# Patient Record
Sex: FEMALE | Race: WHITE | Employment: UNEMPLOYED | URBAN - METROPOLITAN AREA
[De-identification: names, ages, dates, MRNs, and addresses within clinical notes are randomized per-mention and may not be internally consistent; named-entity substitution may affect disease eponyms.]

---

## 2022-10-31 NOTE — PROGRESS NOTES
Hematology/Oncology  Consult  Note    Name: Aan Jenkins  Date: 2022  : 2000  Primary Care Provider: GREGOR Whitt    Ms. Raven Miguel is a 25y.o. year old female with mildly low  platelet count. Patient has menorrhagia and occasional epistaxis. Both of these are much improved when she is on OCP. However, she does not tolerate the OCP which she was on. Family as a whole did not have bleeding or platelet issues as far as the patient knowes. She will ask specifically about this issue. Patient faints every day and will  follow up with Neurologist.    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 These bruises heal easily. Patient drinks alcohol rarely     Patient vapes nicotine often    Current Therapy: diagnostic evaluation    Subjective: The past medical, surgical and social history has been reviewed and remains unchanged. Past Medical History:   Diagnosis Date    Anxiety     Depression      History reviewed. No pertinent surgical history.   Social History     Socioeconomic History    Marital status: SINGLE     Spouse name: Not on file    Number of children: Not on file    Years of education: Not on file    Highest education level: Not on file   Occupational History    Not on file   Tobacco Use    Smoking status: Never    Smokeless tobacco: Never   Vaping Use    Vaping Use: Every day    Substances: Nicotine    Devices: Disposable   Substance and Sexual Activity    Alcohol use: Never    Drug use: Never    Sexual activity: Not on file   Other Topics Concern    Not on file   Social History Narrative    Not on file     Social Determinants of Health     Financial Resource Strain: Not on file   Food Insecurity: Not on file   Transportation Needs: Not on file   Physical Activity: Not on file   Stress: Not on file   Social Connections: Not on file   Intimate Partner Violence: Not on file   Housing Stability: Not on file     Family History   Problem Relation Age of Onset    Diabetes Maternal Grandmother     Diabetes Maternal Grandfather     Diabetes Paternal Grandmother     Diabetes Paternal Grandfather          Review of Systems:    General :The patient has no complaints and there is no physical distress evident. Psychological : patient denies having any psychological symptoms such as hallucinations depression or anxiety. Ophthalmic:the patient denies having any visual impairment or eye discomfort. ENT: there are no abnormalities reported. Allergy and Immunology:the patient denies having any seasonal allergies or allergies to medications other than those already outlined above. Hematological and Lymphatic: the patient denies having any bruising, bleeding or lymphadenopathy. Endocrine: the patient denies having any heat or cold intolerance. There is no history of diabetes or thyroid disorders. Breast: the patient denies having any history of breast mass or lumps. Respiratory:the patient denies having any cough, shortness of breath, or dyspnea on exertion. Cardiovascular: there are no complaints of chest pain, palpitations, chest pounding, or dyspnea on exertion. Gastrointestinal: the patient denies having nausea, emesis, diarrhea, constipation, or blood in the stool. Genito-Urinary: the patient denies having urinary urgency, frequency, or dysuria. Musculoskeletal: with the exception of mild arthralgias the patient has no other musculoskeletal complaints. Neurological:  denies having any numbness, tingling, or neurologic deficits. Dermatological: patient denies having any unexplained rash, skin ulcerations, or hives.     Objective:     Visit Vitals  /68 (BP 1 Location: Right upper arm, BP Patient Position: Sitting)   Pulse 77   Resp 12   Ht 5' 6\" (1.676 m)   Wt 49.9 kg (110 lb)   SpO2 100%   BMI 17.75 kg/m²     Pain Score: 0    Physical Exam: ECO    General: Well appearing, in NAD    Psychologic: mood and affect are appropriate, no anxiety or depression noted    Skin: examination of the skin reveals no bruising, rash or petechiae    HEENT: Normocephalic, atraumatic. Conjunctiva and sclera are clear. Pupils are equal, round and reactive to light. EOMs are intact. ENT without oral mucosal lesions, stomatitis or thrush    Neck: supple without lymphadenopathy, JVD or thyromegaly    Lymphatics: no palpable cervical, supraclavicular, infraclavicular, axillary or inguinal lymphadenopathy    Lungs: clear breath sounds bilaterally, no rhonchi or wheezes noted    Heart: Regular rate and rhythm, S1-S2 noted. Abdomen: soft, non-tender, non-distended, no HSM,     Extremities: without clubbing, cyanosis or edema    Neurologic: no focal deficits, steady gait, Alert and oriented x 3. Laboratory Data:     No results found for this or any previous visit. There is no problem list on file for this patient. Assessment:     1. Low platelet count ([address]    2. Vitamin B 12 deficiency    3. Vitamin D deficiency    4. Iron deficiency    5. Other specified hypothyroidism        Plan:     Blood tests today  We shall have a follow-up phone visit in 1 week's time. Should there be abnormalities we may do more extensive evaluation of patient's hematologic system. I recommended that patient also follow-up with gynecologist to obtain an alternative OCP which she might be able to tolerate better from her emotional psychological point of view  Patient will follow up with neurology to address her daily fainting  Patient had opportunity to ask questions which were answered.   Patient is in agreement with this plan       Orders Placed This Encounter    CBC WITH AUTOMATED DIFF     Standing Status:   Future     Standing Expiration Date:   2023    VITAMIN D, 25 HYDROXY     Standing Status:   Future     Standing Expiration Date:   2023    VITAMIN B12 & FOLATE     Standing Status:   Future     Standing Expiration Date:   11/1/2023    SED RATE (ESR)     Standing Status:   Future     Standing Expiration Date:   11/1/2023    PLATELET COUNT     Standing Status:   Future     Standing Expiration Date:   11/1/2023    PERIPHERAL SMEAR     Standing Status:   Future     Standing Expiration Date:   96/17/0358    METABOLIC PANEL, COMPREHENSIVE     Standing Status:   Future     Standing Expiration Date:   11/1/2023       Buster Olmos MD  11/1/2022

## 2022-11-01 ENCOUNTER — HOSPITAL ENCOUNTER (OUTPATIENT)
Dept: INFUSION THERAPY | Age: 22
Discharge: HOME | End: 2022-11-01
Payer: COMMERCIAL

## 2022-11-01 ENCOUNTER — OFFICE VISIT (OUTPATIENT)
Age: 22
End: 2022-11-01
Payer: COMMERCIAL

## 2022-11-01 VITALS — DIASTOLIC BLOOD PRESSURE: 68 MMHG | SYSTOLIC BLOOD PRESSURE: 102 MMHG

## 2022-11-01 VITALS
RESPIRATION RATE: 12 BRPM | DIASTOLIC BLOOD PRESSURE: 68 MMHG | WEIGHT: 110 LBS | BODY MASS INDEX: 17.68 KG/M2 | OXYGEN SATURATION: 100 % | HEIGHT: 66 IN | SYSTOLIC BLOOD PRESSURE: 102 MMHG | HEART RATE: 77 BPM

## 2022-11-01 DIAGNOSIS — D69.6 LOW PLATELET COUNT (HCC): Primary | ICD-10-CM

## 2022-11-01 DIAGNOSIS — E55.9 VITAMIN D DEFICIENCY: ICD-10-CM

## 2022-11-01 DIAGNOSIS — E53.8 VITAMIN B 12 DEFICIENCY: ICD-10-CM

## 2022-11-01 DIAGNOSIS — D69.6 LOW PLATELET COUNT (HCC): ICD-10-CM

## 2022-11-01 DIAGNOSIS — E61.1 IRON DEFICIENCY: ICD-10-CM

## 2022-11-01 DIAGNOSIS — E03.8 OTHER SPECIFIED HYPOTHYROIDISM: ICD-10-CM

## 2022-11-01 LAB
25(OH)D3 SERPL-MCNC: 10.6 NG/ML (ref 30–100)
ALBUMIN SERPL-MCNC: 4.4 G/DL (ref 3.4–5)
ALBUMIN/GLOB SERPL: 1.4 {RATIO} (ref 0.8–1.7)
ALP SERPL-CCNC: 67 U/L (ref 45–117)
ALT SERPL-CCNC: 19 U/L (ref 13–56)
ANION GAP SERPL CALC-SCNC: 4 MMOL/L (ref 3–18)
AST SERPL-CCNC: 11 U/L (ref 10–38)
BASO+EOS+MONOS # BLD AUTO: 0.4 K/UL (ref 0–2.3)
BASO+EOS+MONOS NFR BLD AUTO: 8 % (ref 0.1–17)
BILIRUB SERPL-MCNC: 0.7 MG/DL (ref 0.2–1)
BUN SERPL-MCNC: 13 MG/DL (ref 7–18)
BUN/CREAT SERPL: 20 (ref 12–20)
CALCIUM SERPL-MCNC: 9.2 MG/DL (ref 8.5–10.1)
CHLORIDE SERPL-SCNC: 106 MMOL/L (ref 100–111)
CO2 SERPL-SCNC: 28 MMOL/L (ref 21–32)
CREAT SERPL-MCNC: 0.64 MG/DL (ref 0.6–1.3)
DIFFERENTIAL METHOD BLD: ABNORMAL
ERYTHROCYTE [DISTWIDTH] IN BLOOD BY AUTOMATED COUNT: 12.6 % (ref 11.5–14.5)
ERYTHROCYTE [SEDIMENTATION RATE] IN BLOOD: 2 MM/HR (ref 0–20)
FOLATE SERPL-MCNC: 18 NG/ML (ref 3.1–17.5)
GLOBULIN SER CALC-MCNC: 3.1 G/DL (ref 2–4)
GLUCOSE SERPL-MCNC: 81 MG/DL (ref 74–99)
HCT VFR BLD AUTO: 40.2 % (ref 36–48)
HGB BLD-MCNC: 13.4 G/DL (ref 12–16)
LYMPHOCYTES # BLD: 2.1 K/UL (ref 1.1–5.9)
LYMPHOCYTES NFR BLD: 46 % (ref 14–44)
MCH RBC QN AUTO: 30.4 PG (ref 25–35)
MCHC RBC AUTO-ENTMCNC: 33.3 G/DL (ref 31–37)
MCV RBC AUTO: 91.2 FL (ref 78–102)
NEUTS SEG # BLD: 2.2 K/UL (ref 1.8–9.5)
NEUTS SEG NFR BLD: 47 % (ref 40–70)
PLATELET # BLD AUTO: 160 K/UL (ref 140–440)
PLATELET # BLD AUTO: 163 K/UL (ref 135–420)
POTASSIUM SERPL-SCNC: 4.4 MMOL/L (ref 3.5–5.5)
PROT SERPL-MCNC: 7.5 G/DL (ref 6.4–8.2)
RBC # BLD AUTO: 4.41 M/UL (ref 4.1–5.1)
SODIUM SERPL-SCNC: 138 MMOL/L (ref 136–145)
VIT B12 SERPL-MCNC: 358 PG/ML (ref 211–911)
WBC # BLD AUTO: 4.7 K/UL (ref 4.5–13)

## 2022-11-01 PROCEDURE — 85652 RBC SED RATE AUTOMATED: CPT

## 2022-11-01 PROCEDURE — 82607 VITAMIN B-12: CPT

## 2022-11-01 PROCEDURE — 85025 COMPLETE CBC W/AUTO DIFF WBC: CPT

## 2022-11-01 PROCEDURE — 36415 COLL VENOUS BLD VENIPUNCTURE: CPT

## 2022-11-01 PROCEDURE — 82306 VITAMIN D 25 HYDROXY: CPT

## 2022-11-01 PROCEDURE — 99204 OFFICE O/P NEW MOD 45 MIN: CPT | Performed by: INTERNAL MEDICINE

## 2022-11-01 PROCEDURE — 80053 COMPREHEN METABOLIC PANEL: CPT

## 2022-11-01 PROCEDURE — 85049 AUTOMATED PLATELET COUNT: CPT

## 2022-11-01 NOTE — LETTER
11/1/2022    Patient: Wilhemenia Grew   YOB: 2000   Date of Visit: 11/1/2022     GREGOR Abbott  [address] [facility] [address]  Via Fax: [phone]    Dear GREGOR Abbott,      Thank you for referring Ms. Ct Brady to [facility] for evaluation. My notes for this consultation are attached. If you have questions, please do not hesitate to call me. I look forward to following your patient along with you.       Sincerely,    Ken Carlin MD

## 2022-11-01 NOTE — PROGRESS NOTES
[facility] Progress Note    Date: 2022    Name: Vicenta George    MRN: [id number]         : 2000    Peripheral Lab Draw      Ms. Gutierrez to [facility], ambulatory at [phone] accompanied by self. Pt was assessed and education was provided. Ms. Lucie Wan vitals were reviewed and patient was observed for 5 minutes prior to treatment. Visit Vitals  /68     Recent Results (from the past 12 hour(s))   CBC WITH 3 PART DIFF    Collection Time: 22 10:53 AM   Result Value Ref Range    WBC 4.7 4.5 - 13.0 K/uL    RBC 4.41 4.10 - 5.10 M/uL    HGB 13.4 12.0 - 16.0 g/dL    HCT 40.2 36 - 48 %    MCV 91.2 78 - 102 FL    MCH 30.4 25.0 - 35.0 PG    MCHC 33.3 31 - 37 g/dL    RDW 12.6 11.5 - 14.5 %    PLATELET 489 911 - 164 K/uL    NEUTROPHILS 47 40 - 70 %    Mixed cells 8 0.1 - 17 %    LYMPHOCYTES 46 (H) 14 - 44 %    ABS. NEUTROPHILS 2.2 1.8 - 9.5 K/UL    ABS. MIXED CELLS 0.4 0.0 - 2.3 K/uL    ABS. LYMPHOCYTES 2.1 1.1 - 5.9 K/UL    DF AUTOMATED         Blood obtained peripherally from left arm x 1 attempt with butterfly needle and sent to lab per written orders. No bleeding or hematoma noted at site. Gauze and coban applied. Ms. Louis Smoker tolerated the phlebotomy, and had no complaints. Patient armband removed and shredded. Ms. Louis Smoker was discharged from [place] [address] in stable condition at [phone].      Kiarra Bryant Phlebotomist PCT  2022  11:24 AM

## 2022-11-02 LAB — PERIPHERAL SMEAR,PSM: NORMAL

## 2022-11-08 ENCOUNTER — VIRTUAL VISIT (OUTPATIENT)
Age: 22
End: 2022-11-08
Payer: COMMERCIAL

## 2022-11-08 DIAGNOSIS — D69.6 LOW PLATELET COUNT (HCC): Primary | ICD-10-CM

## 2022-11-08 DIAGNOSIS — E55.9 VITAMIN D DEFICIENCY: ICD-10-CM

## 2022-11-08 DIAGNOSIS — E53.8 VITAMIN B 12 DEFICIENCY: ICD-10-CM

## 2022-11-08 DIAGNOSIS — E61.1 IRON DEFICIENCY: ICD-10-CM

## 2022-11-08 PROCEDURE — 99443 PR PHYS/QHP TELEPHONE EVALUATION 21-30 MIN: CPT | Performed by: INTERNAL MEDICINE

## 2022-11-08 RX ORDER — ACETAMINOPHEN 500 MG
2000 TABLET ORAL 2 TIMES DAILY
Qty: 180 CAPSULE | Refills: 3 | Status: SHIPPED | OUTPATIENT
Start: 2022-11-08 | End: 2023-11-03

## 2022-11-08 NOTE — PROGRESS NOTES
Ana Jenkins is a 25 y.o. female, evaluated via audio-only technology on 11/8/2022 for mildly low  platelet count. Patient has menorrhagia and occasional epistaxis. Both of these are much improved when she is on OCP. However, she does not tolerate the OCP which she was on. Family as a whole did not have bleeding or platelet issues as far as the patient knowes. She will ask specifically about this issue. Patient faints every day and will  follow up with Neurologist.     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 These bruises heal easily. Patient drinks alcohol rarely      Patient vapes nicotine often      Vit D is low     Plt count is good  Peripheral smear is good  CBC, CMP are good    Vit  B12 and Folate are good     Current Therapy: diagnostic evaluation No Pathology of hematopoietic system identified                               Follow with PCP and refer back to us as needed  . Assessment & Plan:   Diagnoses and all orders for this visit:    1. Low platelet count ([address]    2. Vitamin B 12 deficiency    3. Vitamin D deficiency    4. Iron deficiency    Other orders  -     cholecalciferol (VITAMIN D3) (2,000 UNITS /50 MCG) cap capsule; Take 1 Capsule by mouth two (2) times a day for 360 days. Indications: low vitamin D levels      12  Subjective:       Prior to Admission medications    Not on File     Past Medical History:   Diagnosis Date    Anxiety     Depression        ROS    No data recorded     Autumn M Raven Purchase was evaluated through a patient-initiated, synchronous (real-time) audio only encounter. She (or guardian if applicable) is aware that it is a billable service, which includes applicable co-pays, with coverage as determined by her insurance carrier. This visit was conducted with the patient's (and/or Felicita Dubin guardian's) verbal consent.  She has not had a related appointment within my department in the past 7 days or scheduled within the next 24 hours. The patient was located in a state where the provider was licensed to provide care. The patient was located at: Home: [address] [place], [place] [address]way [facility] [address] [facility]  The provider was located at:  Facility (Appt Department): [place]    Total Time: minutes: 21-30 minutes    Carolina Woodruff MD